# Patient Record
Sex: FEMALE | Race: WHITE | URBAN - METROPOLITAN AREA
[De-identification: names, ages, dates, MRNs, and addresses within clinical notes are randomized per-mention and may not be internally consistent; named-entity substitution may affect disease eponyms.]

---

## 2020-03-11 ENCOUNTER — OFFICE VISIT (OUTPATIENT)
Dept: SURGERY | Facility: CLINIC | Age: 40
End: 2020-03-11
Payer: COMMERCIAL

## 2020-03-11 DIAGNOSIS — L98.9 SKIN LESION: Primary | ICD-10-CM

## 2020-03-11 PROCEDURE — 11104 PUNCH BX SKIN SINGLE LESION: CPT | Performed by: SURGERY

## 2020-03-11 PROCEDURE — 88305 TISSUE EXAM BY PATHOLOGIST: CPT | Performed by: SURGERY

## 2020-03-11 PROCEDURE — 99204 OFFICE O/P NEW MOD 45 MIN: CPT | Performed by: SURGERY

## 2020-03-11 PROCEDURE — 11105 PUNCH BX SKIN EA SEP/ADDL: CPT | Performed by: SURGERY

## 2020-03-11 NOTE — CONSULTS
New Patient Consultation    Chief Complaint:  Patient presents with:  Consult: New Patient - In office Bx two lesions on nose. History of Present Illness:   Narinder Martinez is a 38year old female with 2 nasal skin lesions since 1 year.  She reports Drug use: No    Other Topics      Concerns:        Review of Systems:    A 13 point review of systems was performed on the intake sheet.   The patient reports see HPI  General:   The patient denies, fever, chills, night sweats, fatigue, generalized weakness irregular menses, pelvic pain, pain with intercourse, painful menses, or pregnancy  Musculoskeletal:  The patient denies muscle aches/pain, joint pain, stiff joints, neck pain, back pain or bone pain.   Neurologic:  There is no history of migraines or sever detail. Potential risks complications benefits and alternatives were discussed.   Risks and complications including but not limited to infection, bleeding, scarring, damage to surrounding structures, such as nerves, blood vessels, muscles,  tendons, risk o

## 2020-03-18 ENCOUNTER — OFFICE VISIT (OUTPATIENT)
Dept: SURGERY | Facility: CLINIC | Age: 40
End: 2020-03-18
Payer: COMMERCIAL

## 2020-03-18 VITALS — TEMPERATURE: 98 F

## 2020-03-18 DIAGNOSIS — L98.9 SKIN LESION: Primary | ICD-10-CM

## 2020-03-18 PROCEDURE — 99024 POSTOP FOLLOW-UP VISIT: CPT | Performed by: SURGERY

## 2020-03-18 NOTE — PROGRESS NOTES
Johanna Soliz is a 38year old female who presents today for a follow-up after in office biopsy of nasal skin lesion on 3/11/2020. She denies fever and chills. She denies nausea, vomiting, diarrhea or constipation. Her pain is controlled.  She has bee